# Patient Record
Sex: FEMALE | Race: WHITE
[De-identification: names, ages, dates, MRNs, and addresses within clinical notes are randomized per-mention and may not be internally consistent; named-entity substitution may affect disease eponyms.]

---

## 2017-11-16 ENCOUNTER — HOSPITAL ENCOUNTER (OUTPATIENT)
Dept: HOSPITAL 19 - MC.RAD | Age: 77
End: 2017-11-16
Attending: FAMILY MEDICINE
Payer: MEDICARE

## 2017-11-16 DIAGNOSIS — Z12.31: Primary | ICD-10-CM

## 2018-12-06 ENCOUNTER — HOSPITAL ENCOUNTER (OUTPATIENT)
Dept: HOSPITAL 19 - MC.RAD | Age: 78
End: 2018-12-06
Payer: MEDICARE

## 2018-12-06 DIAGNOSIS — Z12.31: Primary | ICD-10-CM

## 2022-06-20 ENCOUNTER — HOSPITAL ENCOUNTER (INPATIENT)
Dept: HOSPITAL 19 - COL.ER | Age: 82
LOS: 8 days | Discharge: HOME | DRG: 180 | End: 2022-06-28
Attending: FAMILY MEDICINE | Admitting: FAMILY MEDICINE
Payer: MEDICARE

## 2022-06-20 VITALS — DIASTOLIC BLOOD PRESSURE: 45 MMHG | HEART RATE: 89 BPM | SYSTOLIC BLOOD PRESSURE: 117 MMHG | TEMPERATURE: 98.1 F

## 2022-06-20 VITALS — WEIGHT: 187.61 LBS | HEIGHT: 60 IN | BODY MASS INDEX: 36.83 KG/M2

## 2022-06-20 VITALS — HEART RATE: 98 BPM | SYSTOLIC BLOOD PRESSURE: 128 MMHG | TEMPERATURE: 98.3 F | DIASTOLIC BLOOD PRESSURE: 44 MMHG

## 2022-06-20 DIAGNOSIS — Z20.822: ICD-10-CM

## 2022-06-20 DIAGNOSIS — C34.01: Primary | ICD-10-CM

## 2022-06-20 DIAGNOSIS — E78.5: ICD-10-CM

## 2022-06-20 DIAGNOSIS — Z88.7: ICD-10-CM

## 2022-06-20 DIAGNOSIS — I11.0: ICD-10-CM

## 2022-06-20 DIAGNOSIS — I50.30: ICD-10-CM

## 2022-06-20 DIAGNOSIS — Z79.01: ICD-10-CM

## 2022-06-20 DIAGNOSIS — N17.9: ICD-10-CM

## 2022-06-20 DIAGNOSIS — F41.9: ICD-10-CM

## 2022-06-20 DIAGNOSIS — Z99.81: ICD-10-CM

## 2022-06-20 DIAGNOSIS — G47.33: ICD-10-CM

## 2022-06-20 DIAGNOSIS — J96.01: ICD-10-CM

## 2022-06-20 DIAGNOSIS — R04.2: ICD-10-CM

## 2022-06-20 DIAGNOSIS — I48.0: ICD-10-CM

## 2022-06-20 DIAGNOSIS — Z98.51: ICD-10-CM

## 2022-06-20 DIAGNOSIS — Z72.89: ICD-10-CM

## 2022-06-20 DIAGNOSIS — I49.5: ICD-10-CM

## 2022-06-20 DIAGNOSIS — Z79.52: ICD-10-CM

## 2022-06-20 DIAGNOSIS — J44.1: ICD-10-CM

## 2022-06-20 DIAGNOSIS — Z87.892: ICD-10-CM

## 2022-06-20 DIAGNOSIS — C91.10: ICD-10-CM

## 2022-06-20 DIAGNOSIS — Z87.891: ICD-10-CM

## 2022-06-20 LAB
ALBUMIN SERPL-MCNC: 3.3 GM/DL (ref 3.4–4.8)
ANION GAP SERPL CALC-SCNC: 12 MMOL/L (ref 7–16)
BASOPHILS NFR BLD MANUAL: 3 % (ref 0–2)
BUN SERPL-MCNC: 29 MG/DL (ref 10–20)
CALCIUM SERPL-MCNC: 9.5 MG/DL (ref 8.4–10.2)
CHLORIDE SERPL-SCNC: 99 MMOL/L (ref 98–107)
CO2 SERPL-SCNC: 31 MMOL/L (ref 23–31)
CREAT SERPL-SCNC: 1.43 MG/DL (ref 0.57–1.11)
EOSINOPHIL NFR BLD: 1 % (ref 0–4)
ERYTHROCYTE [DISTWIDTH] IN BLOOD BY AUTOMATED COUNT: 14.4 % (ref 11.5–14.5)
GLUCOSE SERPL-MCNC: 129 MG/DL (ref 70–99)
HCT VFR BLD AUTO: 42.7 % (ref 37–47)
HGB BLD-MCNC: 14.2 G/DL (ref 12.5–16)
LYMPHOCYTES NFR BLD MANUAL: 65 % (ref 20–51)
MAGNESIUM SERPL-MCNC: 2.1 MG/DL (ref 1.6–2.6)
MCH RBC QN AUTO: 29 PG (ref 27–31)
MCHC RBC AUTO-ENTMCNC: 33 G/DL (ref 33–37)
MCV RBC AUTO: 86 FL (ref 80–100)
MONOCYTES NFR BLD: 4 % (ref 1.7–9.3)
NEUTS BAND NFR BLD: 1 % (ref 0–10)
NEUTS SEG NFR BLD MANUAL: 26 % (ref 42–75.2)
PHOSPHATE SERPL-MCNC: 3.6 MG/DL (ref 2.3–4.7)
PLATELET # BLD AUTO: 288 K/MM3 (ref 130–400)
PLATELET BLD QL SMEAR: NORMAL
PMV BLD AUTO: 9.4 FL (ref 7.4–10.4)
POTASSIUM SERPL-SCNC: 3.7 MMOL/L (ref 3.5–4.5)
RBC # BLD AUTO: 4.97 M/MM3 (ref 4.1–5.3)
SODIUM SERPL-SCNC: 142 MMOL/L (ref 136–145)
TROPONIN I SERPL-MCNC: 0.02 NG/ML (ref 0–0.03)

## 2022-06-20 PROCEDURE — A9500 TC99M SESTAMIBI: HCPCS

## 2022-06-20 NOTE — NUR
TX GIVEN VIA MOUTHPIECE, TOLERATED WELL.
PT ON 1L BLEED IN WITH HOME CPAP AFTER TX AND TOLERATING WELL.

## 2022-06-21 VITALS — SYSTOLIC BLOOD PRESSURE: 115 MMHG | HEART RATE: 71 BPM | DIASTOLIC BLOOD PRESSURE: 59 MMHG | TEMPERATURE: 97.6 F

## 2022-06-21 VITALS — TEMPERATURE: 97.4 F | SYSTOLIC BLOOD PRESSURE: 132 MMHG | HEART RATE: 88 BPM | DIASTOLIC BLOOD PRESSURE: 57 MMHG

## 2022-06-21 VITALS — DIASTOLIC BLOOD PRESSURE: 59 MMHG | TEMPERATURE: 98.4 F | SYSTOLIC BLOOD PRESSURE: 119 MMHG | HEART RATE: 100 BPM

## 2022-06-21 VITALS — SYSTOLIC BLOOD PRESSURE: 121 MMHG | HEART RATE: 117 BPM | TEMPERATURE: 98 F | DIASTOLIC BLOOD PRESSURE: 83 MMHG

## 2022-06-21 VITALS — TEMPERATURE: 97.7 F | HEART RATE: 72 BPM | DIASTOLIC BLOOD PRESSURE: 56 MMHG | SYSTOLIC BLOOD PRESSURE: 122 MMHG

## 2022-06-21 LAB
ANION GAP SERPL CALC-SCNC: 16 MMOL/L (ref 7–16)
BUN SERPL-MCNC: 26 MG/DL (ref 10–20)
CALCIUM SERPL-MCNC: 9.7 MG/DL (ref 8.4–10.2)
CHLORIDE SERPL-SCNC: 99 MMOL/L (ref 98–107)
CO2 SERPL-SCNC: 28 MMOL/L (ref 23–31)
CREAT SERPL-SCNC: 1.05 MG/DL (ref 0.57–1.11)
ERYTHROCYTE [DISTWIDTH] IN BLOOD BY AUTOMATED COUNT: 14.5 % (ref 11.5–14.5)
GLUCOSE SERPL-MCNC: 156 MG/DL (ref 70–99)
HCT VFR BLD AUTO: 46.3 % (ref 37–47)
HGB BLD-MCNC: 15 G/DL (ref 12.5–16)
LYMPHOCYTES NFR BLD MANUAL: 34 % (ref 20–51)
MAGNESIUM SERPL-MCNC: 2.1 MG/DL (ref 1.6–2.6)
MCH RBC QN AUTO: 29 PG (ref 27–31)
MCHC RBC AUTO-ENTMCNC: 32 G/DL (ref 33–37)
MCV RBC AUTO: 88 FL (ref 80–100)
MONOCYTES NFR BLD: 1 % (ref 1.7–9.3)
NEUTS BAND NFR BLD: 2 % (ref 0–10)
NEUTS SEG NFR BLD MANUAL: 63 % (ref 42–75.2)
PLATELET # BLD AUTO: 299 K/MM3 (ref 130–400)
PLATELET BLD QL SMEAR: NORMAL
PMV BLD AUTO: 9.6 FL (ref 7.4–10.4)
POTASSIUM SERPL-SCNC: 3.8 MMOL/L (ref 3.5–4.5)
RBC # BLD AUTO: 5.26 M/MM3 (ref 4.1–5.3)
SODIUM SERPL-SCNC: 143 MMOL/L (ref 136–145)

## 2022-06-21 NOTE — NUR
Mercedes:  Shyann
Situation:   stopped by room on rounds
Background:  PT was resting with family in the room.
Assessment:  PT and  had a great conversation.  PT asked for prayer
and  prayed with her, her  and daughter. Pt appreciated the
visit
Recommendation:   will follow up as needed

## 2022-06-21 NOTE — NUR
RESTED THROUGH THE NIGHT WITHOUT INCIDENT. DENIES SOA, CHEST PAIN OR DIZZY.
CPAP ON OVERNIGHT. IV STERIODS GIVEN THIS AM. CALL LIGHT WI REACH. NEEDS MET.

## 2022-06-21 NOTE — NUR
PT ADMIT AFTER FEELING SOA X 2DAYS, WENT TO SEE CARDIOLOGY TODAY SENT HERE.
ADMIT FOR HYPOXIA. HX AND ASSESSMENT OBTAINED. POC DISCUSSED. MED REC REVIEWED
W DAUGHTER. ROOM ORIENATION, CALL LIGHT WI REACH.

## 2022-06-21 NOTE — NUR
Scheduled medications given. Shift assessment preformed. VSS. Patient A&O.
Patient denies any pain, discomfort, SOA, or further needs at this time. Call
light in reach.

## 2022-06-21 NOTE — NUR
MARIANNE met with the patient, her  (Ralph, ph#[phone]), and daughter,
Babita (ph#[phone]), to discuss discharge plan. The patient lives in
[place] with her . She reports independence with ADLs and has a cane,
CPAP from [employer], walk-in shower, and grabbars. The patient's PCP is Dr. Sylvester Carr and she receives her medications from [facility]. The patient does
not have a DPOA-HC in EMR, but her  states that they do have the
document completed and that he is the patient's DPOA-HC. The patient plans on
returning home with her  upon discharge. MARIANNE asked the PA for PT/OT to
be ordered.
 
*Discharge plan: home with *

## 2022-06-22 VITALS — DIASTOLIC BLOOD PRESSURE: 76 MMHG | HEART RATE: 90 BPM | SYSTOLIC BLOOD PRESSURE: 143 MMHG | TEMPERATURE: 97.6 F

## 2022-06-22 VITALS — TEMPERATURE: 97.8 F | DIASTOLIC BLOOD PRESSURE: 66 MMHG | SYSTOLIC BLOOD PRESSURE: 109 MMHG | HEART RATE: 91 BPM

## 2022-06-22 VITALS — TEMPERATURE: 97.8 F | HEART RATE: 90 BPM | DIASTOLIC BLOOD PRESSURE: 68 MMHG | SYSTOLIC BLOOD PRESSURE: 124 MMHG

## 2022-06-22 VITALS — SYSTOLIC BLOOD PRESSURE: 120 MMHG | HEART RATE: 84 BPM | TEMPERATURE: 98.3 F | DIASTOLIC BLOOD PRESSURE: 48 MMHG

## 2022-06-22 VITALS — DIASTOLIC BLOOD PRESSURE: 59 MMHG | TEMPERATURE: 97.7 F | SYSTOLIC BLOOD PRESSURE: 136 MMHG | HEART RATE: 75 BPM

## 2022-06-22 VITALS — DIASTOLIC BLOOD PRESSURE: 60 MMHG | SYSTOLIC BLOOD PRESSURE: 135 MMHG | HEART RATE: 87 BPM | TEMPERATURE: 97.6 F

## 2022-06-22 LAB
ANION GAP SERPL CALC-SCNC: 16 MMOL/L (ref 7–16)
BUN SERPL-MCNC: 33 MG/DL (ref 10–20)
CALCIUM SERPL-MCNC: 10 MG/DL (ref 8.4–10.2)
CHLORIDE SERPL-SCNC: 98 MMOL/L (ref 98–107)
CO2 SERPL-SCNC: 28 MMOL/L (ref 23–31)
CREAT SERPL-SCNC: 0.96 MG/DL (ref 0.57–1.11)
GLUCOSE SERPL-MCNC: 140 MG/DL (ref 70–99)
INR BLD: 1.2 (ref 0.8–3)
PATH REV BLD -IMP: (no result)
POTASSIUM SERPL-SCNC: 4.4 MMOL/L (ref 3.5–4.5)
PROTHROMBIN TIME: 14.2 SECONDS (ref 9.7–12.8)
SODIUM SERPL-SCNC: 142 MMOL/L (ref 136–145)

## 2022-06-22 NOTE — NUR
PT SITTING AT BEDSIDE ON ROOM AIR. PT JUST GOT OUT OF THE SHOWER. STATES "I AM
A LITTLE SOB WHEN I GET UP AND WALK AROUND." PT STATES THAT SHE DOES NOT WANT
TO WEAR HER O2 AT THIS TIME. "I DO NOT NEED IT." PT STATES NO OTHER
NEEDS/CONCERNS AT THIS TIME. CALL LIGHT IS WITHIN REACH.

## 2022-06-22 NOTE — NUR
PT CONT TO HAVE SOME SOA, DENIES CHEST PAIN OR DIZZY. FEELS ANIXIOUS AFTER
LEARNING SHE HAS LUNG CA. XANAX GIVEN W PM MEDS. POC DISCUSSED. CALL LIGHT WI
REACH. NEEDS MET

## 2022-06-22 NOTE — NUR
PT LAYING SUPINE IN BED ON ROOM AIR WITH FAMIY AT BEDSIDE. PT STATES NO SOB AT
THIS TIME OR PAIN. PT STATES "THANK YOU FOR ALL OF YOUR CARE." PT STATES NO
NEEDS AT THIS TIME. REMINDED PT THAT SHE WILL BE NPO AT MIDNIGHT FOR STRESS
TEST TOMORROW. PT VOICED UNDERSTANDING. CALL LIGHT IS WITHIN REACH.

## 2022-06-22 NOTE — NUR
PT is recommending home. Dr. Roca, pulmonologist, notified the clinical team
that the patient has a right lung mass and is scheduled to undergo a biopsy on
Friday.
 
*Discharge plan: home with *

## 2022-06-22 NOTE — NUR
TX GIVEN VIA MOUTHPIECE, TOLERATED WELL.
PT ON ROOM AIR BEFORE AND AFTER TX.
CPAP SETUP AND READY FOR PT.

## 2022-06-23 VITALS — DIASTOLIC BLOOD PRESSURE: 77 MMHG | HEART RATE: 115 BPM | SYSTOLIC BLOOD PRESSURE: 123 MMHG

## 2022-06-23 VITALS — HEART RATE: 106 BPM | DIASTOLIC BLOOD PRESSURE: 64 MMHG | SYSTOLIC BLOOD PRESSURE: 129 MMHG

## 2022-06-23 VITALS — HEART RATE: 77 BPM | TEMPERATURE: 97.6 F | DIASTOLIC BLOOD PRESSURE: 70 MMHG | SYSTOLIC BLOOD PRESSURE: 136 MMHG

## 2022-06-23 VITALS — SYSTOLIC BLOOD PRESSURE: 108 MMHG | HEART RATE: 78 BPM | DIASTOLIC BLOOD PRESSURE: 70 MMHG

## 2022-06-23 VITALS — DIASTOLIC BLOOD PRESSURE: 61 MMHG | HEART RATE: 58 BPM | TEMPERATURE: 97.7 F | SYSTOLIC BLOOD PRESSURE: 146 MMHG

## 2022-06-23 VITALS — SYSTOLIC BLOOD PRESSURE: 151 MMHG | HEART RATE: 85 BPM | DIASTOLIC BLOOD PRESSURE: 72 MMHG

## 2022-06-23 VITALS — DIASTOLIC BLOOD PRESSURE: 65 MMHG | SYSTOLIC BLOOD PRESSURE: 104 MMHG | HEART RATE: 84 BPM

## 2022-06-23 VITALS — HEART RATE: 61 BPM | SYSTOLIC BLOOD PRESSURE: 126 MMHG | TEMPERATURE: 97.9 F | DIASTOLIC BLOOD PRESSURE: 68 MMHG

## 2022-06-23 VITALS — DIASTOLIC BLOOD PRESSURE: 77 MMHG | HEART RATE: 87 BPM | SYSTOLIC BLOOD PRESSURE: 125 MMHG

## 2022-06-23 VITALS — SYSTOLIC BLOOD PRESSURE: 129 MMHG | TEMPERATURE: 97.9 F | DIASTOLIC BLOOD PRESSURE: 57 MMHG

## 2022-06-23 VITALS — DIASTOLIC BLOOD PRESSURE: 68 MMHG | SYSTOLIC BLOOD PRESSURE: 140 MMHG | HEART RATE: 106 BPM

## 2022-06-23 VITALS — SYSTOLIC BLOOD PRESSURE: 115 MMHG | HEART RATE: 119 BPM | DIASTOLIC BLOOD PRESSURE: 63 MMHG

## 2022-06-23 VITALS — HEART RATE: 85 BPM | SYSTOLIC BLOOD PRESSURE: 95 MMHG | DIASTOLIC BLOOD PRESSURE: 74 MMHG

## 2022-06-23 VITALS — SYSTOLIC BLOOD PRESSURE: 118 MMHG | DIASTOLIC BLOOD PRESSURE: 75 MMHG | HEART RATE: 105 BPM

## 2022-06-23 VITALS — SYSTOLIC BLOOD PRESSURE: 129 MMHG | DIASTOLIC BLOOD PRESSURE: 57 MMHG | HEART RATE: 91 BPM

## 2022-06-23 VITALS — DIASTOLIC BLOOD PRESSURE: 76 MMHG | SYSTOLIC BLOOD PRESSURE: 119 MMHG | HEART RATE: 103 BPM

## 2022-06-23 VITALS — SYSTOLIC BLOOD PRESSURE: 134 MMHG | DIASTOLIC BLOOD PRESSURE: 80 MMHG | HEART RATE: 92 BPM

## 2022-06-23 VITALS — SYSTOLIC BLOOD PRESSURE: 151 MMHG | HEART RATE: 85 BPM | TEMPERATURE: 97.6 F | DIASTOLIC BLOOD PRESSURE: 72 MMHG

## 2022-06-23 VITALS — SYSTOLIC BLOOD PRESSURE: 115 MMHG | DIASTOLIC BLOOD PRESSURE: 74 MMHG | HEART RATE: 51 BPM

## 2022-06-23 VITALS — HEART RATE: 83 BPM | SYSTOLIC BLOOD PRESSURE: 126 MMHG | DIASTOLIC BLOOD PRESSURE: 72 MMHG

## 2022-06-23 VITALS — SYSTOLIC BLOOD PRESSURE: 137 MMHG | DIASTOLIC BLOOD PRESSURE: 66 MMHG | TEMPERATURE: 97.9 F | HEART RATE: 70 BPM

## 2022-06-23 VITALS — DIASTOLIC BLOOD PRESSURE: 80 MMHG | SYSTOLIC BLOOD PRESSURE: 117 MMHG | HEART RATE: 98 BPM

## 2022-06-23 VITALS — DIASTOLIC BLOOD PRESSURE: 53 MMHG | SYSTOLIC BLOOD PRESSURE: 121 MMHG | HEART RATE: 96 BPM

## 2022-06-23 VITALS — HEART RATE: 48 BPM | DIASTOLIC BLOOD PRESSURE: 66 MMHG | SYSTOLIC BLOOD PRESSURE: 109 MMHG

## 2022-06-23 VITALS — DIASTOLIC BLOOD PRESSURE: 72 MMHG | HEART RATE: 95 BPM | SYSTOLIC BLOOD PRESSURE: 104 MMHG

## 2022-06-23 LAB
ANION GAP SERPL CALC-SCNC: 12 MMOL/L (ref 7–16)
BASOPHILS # BLD: 0 K/MM3 (ref 0–0.2)
BASOPHILS NFR BLD AUTO: 0.1 % (ref 0–2)
BUN SERPL-MCNC: 33 MG/DL (ref 10–20)
CALCIUM SERPL-MCNC: 9.4 MG/DL (ref 8.4–10.2)
CHLORIDE SERPL-SCNC: 102 MMOL/L (ref 98–107)
CO2 SERPL-SCNC: 27 MMOL/L (ref 23–31)
CREAT SERPL-SCNC: 0.85 MG/DL (ref 0.57–1.11)
EOSINOPHIL # BLD: 0 K/MM3 (ref 0–0.7)
EOSINOPHIL NFR BLD: 0 % (ref 0–4)
ERYTHROCYTE [DISTWIDTH] IN BLOOD BY AUTOMATED COUNT: 14.9 % (ref 11.5–14.5)
GLUCOSE SERPL-MCNC: 139 MG/DL (ref 70–99)
GRANULOCYTES # BLD AUTO: 72.7 % (ref 42.2–75.2)
HCT VFR BLD AUTO: 43.3 % (ref 37–47)
HGB BLD-MCNC: 14 G/DL (ref 12.5–16)
LYMPHOCYTES # BLD: 5.1 K/MM3 (ref 1.2–3.4)
LYMPHOCYTES NFR BLD: 23.7 % (ref 20–51)
MAGNESIUM SERPL-MCNC: 2.5 MG/DL (ref 1.6–2.6)
MCH RBC QN AUTO: 29 PG (ref 27–31)
MCHC RBC AUTO-ENTMCNC: 32 G/DL (ref 33–37)
MCV RBC AUTO: 89 FL (ref 80–100)
MONOCYTES # BLD: 0.6 K/MM3 (ref 0.1–0.6)
MONOCYTES NFR BLD AUTO: 2.9 % (ref 1.7–9.3)
NEUTROPHILS # BLD: 15.8 K/MM3 (ref 1.4–6.5)
PLATELET # BLD AUTO: 296 K/MM3 (ref 130–400)
PMV BLD AUTO: 9.3 FL (ref 7.4–10.4)
POTASSIUM SERPL-SCNC: 4.7 MMOL/L (ref 3.5–4.5)
RBC # BLD AUTO: 4.89 M/MM3 (ref 4.1–5.3)
SODIUM SERPL-SCNC: 141 MMOL/L (ref 136–145)

## 2022-06-23 NOTE — NUR
PT LAYING SUPINE IN BED ON ROOM AIR WITH DAUGHTER AT BEDSIDE. PT STATES THAT
SHE IS HAVING A LITTLE BIT OF SOB AND IS REQUESTING A BREATHING TREATMENT. RT
WAS CALLED AND STATES THAT IT IS SHIFT CHANGE BUT THEY WILL PUT ON ON THE TOP
OF THE LIST TO COME IN. PT INFOMRED. PT STATES THAT SHE WOULD LIKE TO HAVE A
XANAX. MEDICATION WAS GIVEN. DAUGHTER STATES THAT WITH EVERYTHING GOING ON AND
THE PROCEDURE TOMORROW SHE IS JUST HAVING A LITTLE BIT OF ANXIETY. I SUGGESTED
TO PT AND FAMILY THAT MAYBE GETTING OUT OF THE ROOM AND WALKING THE HALLWAYS
TILL HELP GET A BREATH OF FRESH AIR AND HELP TAKE HER MIND OF STUFF. PT WAS
GIVEN A WHEELCHAIR AND DAUGHTER PUSHED HER IN THE HALLWAYS. PT STATES NO OTHER
CONCERNS. CALL LIGHT IS WITHIN REACH.

## 2022-06-23 NOTE — NUR
Patient assessed around 2030. Patient alert and oriented x 4, and able to make
needs known. Patient drowsy, but awakens easily. Denies pain and discomfort.
INT to left hand and left AC. Patient denies SOB and dyspnea at rest, but does
with exertion. LS CTA in left lung fields, diminished in right. HRR. Telemetry
in place. BSAx4. Abdomen soft and non-tender. Voices no questions, needs, or
concerns at this time. In bed with call light within reach. Aware that she is
NPO after midnight for bronchoscopy tomorrow morning.

## 2022-06-23 NOTE — NUR
REPORT FROM SOLOMON PATTERSON. PATIENT IN BED, EYES CLOSED, RR EVEN AND UNLABORED. RT
AT BEDSIDE. O2 SATS 94% PER RT.

## 2022-06-23 NOTE — NUR
PT LAYING SUPINE IN BED ON ROOM AIR WITH FAMILY AT BEDSIDE. PT STATES THAT SHE
IS NOT HAVING ANY SOB AT THIS TIME. "I AM JUST WAITING TO DO MY TEST."
INFOMRED PT SINCE SHE HAS BEEN INJECTED THEN IT SHOULD BE SOMETIME SOON. PT
STATES NO NEEDS OR CONCERNS AT THIS TIME. "I WILL DEF WANT A DRINK OF WATER
WHEN I GET BACK FROM THE SCAN." CALL LIGHT IS WITHIN REACH.

## 2022-06-24 VITALS — HEART RATE: 52 BPM | SYSTOLIC BLOOD PRESSURE: 149 MMHG | TEMPERATURE: 97.7 F | DIASTOLIC BLOOD PRESSURE: 60 MMHG

## 2022-06-24 VITALS — HEART RATE: 71 BPM | SYSTOLIC BLOOD PRESSURE: 143 MMHG | DIASTOLIC BLOOD PRESSURE: 87 MMHG

## 2022-06-24 VITALS — HEART RATE: 69 BPM | SYSTOLIC BLOOD PRESSURE: 119 MMHG | DIASTOLIC BLOOD PRESSURE: 98 MMHG

## 2022-06-24 VITALS — HEART RATE: 51 BPM | SYSTOLIC BLOOD PRESSURE: 161 MMHG | TEMPERATURE: 97.6 F | DIASTOLIC BLOOD PRESSURE: 74 MMHG

## 2022-06-24 VITALS — SYSTOLIC BLOOD PRESSURE: 156 MMHG | HEART RATE: 59 BPM | DIASTOLIC BLOOD PRESSURE: 70 MMHG

## 2022-06-24 VITALS — HEART RATE: 56 BPM | TEMPERATURE: 97.9 F | DIASTOLIC BLOOD PRESSURE: 90 MMHG | SYSTOLIC BLOOD PRESSURE: 189 MMHG

## 2022-06-24 VITALS — HEART RATE: 79 BPM | SYSTOLIC BLOOD PRESSURE: 171 MMHG | DIASTOLIC BLOOD PRESSURE: 92 MMHG | TEMPERATURE: 97.6 F

## 2022-06-24 VITALS — HEART RATE: 51 BPM | SYSTOLIC BLOOD PRESSURE: 163 MMHG | DIASTOLIC BLOOD PRESSURE: 71 MMHG | TEMPERATURE: 97.5 F

## 2022-06-24 VITALS — HEART RATE: 73 BPM | DIASTOLIC BLOOD PRESSURE: 115 MMHG | SYSTOLIC BLOOD PRESSURE: 173 MMHG

## 2022-06-24 VITALS — HEART RATE: 66 BPM | SYSTOLIC BLOOD PRESSURE: 115 MMHG | DIASTOLIC BLOOD PRESSURE: 76 MMHG

## 2022-06-24 VITALS — TEMPERATURE: 97.9 F | HEART RATE: 54 BPM | SYSTOLIC BLOOD PRESSURE: 169 MMHG | DIASTOLIC BLOOD PRESSURE: 83 MMHG

## 2022-06-24 LAB
ANION GAP SERPL CALC-SCNC: 13 MMOL/L (ref 7–16)
BUN SERPL-MCNC: 36 MG/DL (ref 10–20)
CALCIUM SERPL-MCNC: 9.5 MG/DL (ref 8.4–10.2)
CHLORIDE SERPL-SCNC: 104 MMOL/L (ref 98–107)
CO2 SERPL-SCNC: 27 MMOL/L (ref 23–31)
CREAT SERPL-SCNC: 1.01 MG/DL (ref 0.57–1.11)
ERYTHROCYTE [DISTWIDTH] IN BLOOD BY AUTOMATED COUNT: 14.8 % (ref 11.5–14.5)
GLUCOSE SERPL-MCNC: 132 MG/DL (ref 70–99)
HCT VFR BLD AUTO: 48.6 % (ref 37–47)
HGB BLD-MCNC: 15.4 G/DL (ref 12.5–16)
LYMPHOCYTES NFR BLD MANUAL: 43 % (ref 20–51)
MAGNESIUM SERPL-MCNC: 2.7 MG/DL (ref 1.6–2.6)
MCH RBC QN AUTO: 29 PG (ref 27–31)
MCHC RBC AUTO-ENTMCNC: 32 G/DL (ref 33–37)
MCV RBC AUTO: 90 FL (ref 80–100)
MONOCYTES NFR BLD: 1 % (ref 1.7–9.3)
NEUTS BAND NFR BLD: 3 % (ref 0–10)
NEUTS SEG NFR BLD MANUAL: 53 % (ref 42–75.2)
PLATELET # BLD AUTO: 326 K/MM3 (ref 130–400)
PLATELET BLD QL SMEAR: NORMAL
PMV BLD AUTO: 9.5 FL (ref 7.4–10.4)
POTASSIUM SERPL-SCNC: 4.7 MMOL/L (ref 3.5–4.5)
RBC # BLD AUTO: 5.4 M/MM3 (ref 4.1–5.3)
SODIUM SERPL-SCNC: 144 MMOL/L (ref 136–145)

## 2022-06-24 PROCEDURE — 0B938ZZ DRAINAGE OF RIGHT MAIN BRONCHUS, VIA NATURAL OR ARTIFICIAL OPENING ENDOSCOPIC: ICD-10-PCS | Performed by: INTERNAL MEDICINE

## 2022-06-24 PROCEDURE — 0B9J8ZZ DRAINAGE OF LEFT LOWER LUNG LOBE, VIA NATURAL OR ARTIFICIAL OPENING ENDOSCOPIC: ICD-10-PCS | Performed by: INTERNAL MEDICINE

## 2022-06-24 PROCEDURE — 0BDC8ZX EXTRACTION OF RIGHT UPPER LUNG LOBE, VIA NATURAL OR ARTIFICIAL OPENING ENDOSCOPIC, DIAGNOSTIC: ICD-10-PCS | Performed by: INTERNAL MEDICINE

## 2022-06-24 PROCEDURE — 0B9C8ZX DRAINAGE OF RIGHT UPPER LUNG LOBE, VIA NATURAL OR ARTIFICIAL OPENING ENDOSCOPIC, DIAGNOSTIC: ICD-10-PCS | Performed by: INTERNAL MEDICINE

## 2022-06-24 PROCEDURE — 0BDB8ZX EXTRACTION OF LEFT LOWER LOBE BRONCHUS, VIA NATURAL OR ARTIFICIAL OPENING ENDOSCOPIC, DIAGNOSTIC: ICD-10-PCS | Performed by: INTERNAL MEDICINE

## 2022-06-24 NOTE — NUR
PATIENT VERY PLESANT, SLIGHTLY ANXIOUS ABOUT BRONCHOSCOPY THIS MORNING.
PATIENT DAUGHTER AT BEDSIDE. NO COMPLAINTS. SO NOTED DIFFICULTY WITH
BREATHING, O2 SATURATIONS UP 95%, PATIENT SEEMS SOB HOWEVER. WILL CONTINUE TO
MONITOR.

## 2022-06-24 NOTE — NUR
Patient assessed around 2030. Denied pain and discomfort. Peripheral INT to
left hand and left AC. Continues on oxygen at 3 L/min via NC. LS coarse
crackles right upper lobe, diminished right middle and lower. CTA left lobes.
Wears CPAP at night. Complained of increased SOB around 2200. SPO2 94%. Given
Xanax and called RT for neb tx. Also given PRN Appresoline for elevated BP per
order. HRR. Telemetry in place-sinus pawan. BSAx4. Voices no further
questions, needs, or concerns at this time. In bed with call light within
reach.

## 2022-06-24 NOTE — NUR
PATIENT ARRIVED BACK FROM ENDO LAB ALERT AND ORIENTED, NO COMPLAINTS OF PAIN.
POST-OP VITALS INITIATED, DIET ORDERED AND PATIENT SETTLED IN ROOM.

## 2022-06-24 NOTE — NUR
Patient has denied having pain and discomfort, except for being cold. Given
warm blanket and turned temperature up in room which patient reports helped.
Has been NPO since midnight. Voices no questions, needs, or concerns at this
time. In bed with call light within reach.

## 2022-06-24 NOTE — NUR
Patient tolerated broncoscopy, biopsy and wash well. Worked with physical and
occupational therapy after, ate a full meal. No hemoptsis. No complaints of
pain. Patient rested this afternoon, stated feeling "physically drained" after
procedures and workout. No significant events today.

## 2022-06-25 VITALS — HEART RATE: 62 BPM | TEMPERATURE: 97.7 F | DIASTOLIC BLOOD PRESSURE: 70 MMHG | SYSTOLIC BLOOD PRESSURE: 143 MMHG

## 2022-06-25 VITALS — HEART RATE: 59 BPM | TEMPERATURE: 99.2 F | DIASTOLIC BLOOD PRESSURE: 71 MMHG | SYSTOLIC BLOOD PRESSURE: 155 MMHG

## 2022-06-25 VITALS — DIASTOLIC BLOOD PRESSURE: 71 MMHG | SYSTOLIC BLOOD PRESSURE: 175 MMHG | TEMPERATURE: 97.8 F | HEART RATE: 55 BPM

## 2022-06-25 VITALS — TEMPERATURE: 97.9 F | DIASTOLIC BLOOD PRESSURE: 75 MMHG | HEART RATE: 54 BPM | SYSTOLIC BLOOD PRESSURE: 166 MMHG

## 2022-06-25 VITALS — HEART RATE: 58 BPM | SYSTOLIC BLOOD PRESSURE: 179 MMHG | TEMPERATURE: 97.7 F | DIASTOLIC BLOOD PRESSURE: 65 MMHG

## 2022-06-25 VITALS — DIASTOLIC BLOOD PRESSURE: 70 MMHG | TEMPERATURE: 97.7 F | SYSTOLIC BLOOD PRESSURE: 146 MMHG | HEART RATE: 55 BPM

## 2022-06-25 VITALS — HEART RATE: 58 BPM | TEMPERATURE: 97.7 F | DIASTOLIC BLOOD PRESSURE: 76 MMHG | SYSTOLIC BLOOD PRESSURE: 174 MMHG

## 2022-06-25 LAB
ANION GAP SERPL CALC-SCNC: 12 MMOL/L (ref 7–16)
BUN SERPL-MCNC: 38 MG/DL (ref 10–20)
CALCIUM SERPL-MCNC: 9.1 MG/DL (ref 8.4–10.2)
CHLORIDE SERPL-SCNC: 107 MMOL/L (ref 98–107)
CO2 SERPL-SCNC: 25 MMOL/L (ref 23–31)
CREAT SERPL-SCNC: 0.81 MG/DL (ref 0.57–1.11)
ERYTHROCYTE [DISTWIDTH] IN BLOOD BY AUTOMATED COUNT: 14.4 % (ref 11.5–14.5)
GLUCOSE SERPL-MCNC: 115 MG/DL (ref 70–99)
HCT VFR BLD AUTO: 44.9 % (ref 37–47)
HGB BLD-MCNC: 14.7 G/DL (ref 12.5–16)
LYMPHOCYTES NFR BLD MANUAL: 37 % (ref 20–51)
MAGNESIUM SERPL-MCNC: 2.6 MG/DL (ref 1.6–2.6)
MCH RBC QN AUTO: 29 PG (ref 27–31)
MCHC RBC AUTO-ENTMCNC: 33 G/DL (ref 33–37)
MCV RBC AUTO: 87 FL (ref 80–100)
MONOCYTES NFR BLD: 2 % (ref 1.7–9.3)
NEUTS SEG NFR BLD MANUAL: 61 % (ref 42–75.2)
PLATELET # BLD AUTO: 296 K/MM3 (ref 130–400)
PLATELET BLD QL SMEAR: NORMAL
PMV BLD AUTO: 9.6 FL (ref 7.4–10.4)
POTASSIUM SERPL-SCNC: 4.6 MMOL/L (ref 3.5–4.5)
RBC # BLD AUTO: 5.16 M/MM3 (ref 4.1–5.3)
SODIUM SERPL-SCNC: 144 MMOL/L (ref 136–145)

## 2022-06-25 NOTE — NUR
PATIENT HAD NO SIGNIFICANT EVENTS TODAY. MAY NEED HOME O2. PATIENT STRUGGLED
WITH SHORTNESS OF BREATH AFTER SHOWER, OXYGEN SATURATION AT 90%, WITH SOME
DIFFICULTY BREATHING. ONCE O2 PLACED, PATIENT QUICKLY RECOVERED. PATIENT
REQUESTING XANAX AT NIGHT TO HELP WITH ANXIOUSNESS AND FOR SLEEP. MAY NEED
PRESCRIPTION WHEN DISCHARGED. PATIENT STILL VERY INDEPENDENT IN ROOM. ONE DOSE
OF IV HYDROLAZINE NEEDED TODAY FOR SYSTOLIC BP>170. PATIENT RESTING MOST OF
THE DAY TODAY. NO COMPLAINTS OF PAIN. PLAN FOR DISCHARGE TOMORROW AFTER 6TH
DOSE OF SOTOLOL.

## 2022-06-25 NOTE — NUR
Patient assessed around 2010. On oxygen at 1 L/min via NC. Wears CPAP at
night. Reports feeling much better today. Denies pain and discomfort. In bed
with call light within reach. Voices no questions, needs, or concerns at this
time.

## 2022-06-25 NOTE — NUR
Patient wore CPAP throughout the night. Denies pain and discomfort. Voices no
questions, needs, or concerns at this time. In bed with call light within
reach.

## 2022-06-25 NOTE — NUR
PATIENT UP AND MOVING IN ROOM, SHOWER INDEPENDENTLY WITH DAUGHTER. NO
COMPLAINTS OF PAIN, NO CHANGES IN CONDITION OVER NIGHT. PLAN, AFTER SOTOLOL
INITIATION AND CLEARANCE FROM CARDIO TO DISCHARGE HOME. PATIENT HAVING SOME
LOOSE STOOLS, NO SIGNS OF INFECTION HOWEVER. NO UNCONTROLLED INCONTINENCE.

## 2022-06-26 VITALS — SYSTOLIC BLOOD PRESSURE: 131 MMHG | HEART RATE: 61 BPM | DIASTOLIC BLOOD PRESSURE: 61 MMHG | TEMPERATURE: 97.4 F

## 2022-06-26 VITALS — HEART RATE: 75 BPM | TEMPERATURE: 97.6 F | SYSTOLIC BLOOD PRESSURE: 178 MMHG | DIASTOLIC BLOOD PRESSURE: 81 MMHG

## 2022-06-26 VITALS — HEART RATE: 61 BPM | DIASTOLIC BLOOD PRESSURE: 55 MMHG | TEMPERATURE: 97.9 F | SYSTOLIC BLOOD PRESSURE: 130 MMHG

## 2022-06-26 VITALS — HEART RATE: 68 BPM | DIASTOLIC BLOOD PRESSURE: 91 MMHG | SYSTOLIC BLOOD PRESSURE: 189 MMHG | TEMPERATURE: 98.4 F

## 2022-06-26 VITALS — DIASTOLIC BLOOD PRESSURE: 58 MMHG | SYSTOLIC BLOOD PRESSURE: 153 MMHG | TEMPERATURE: 98 F | HEART RATE: 57 BPM

## 2022-06-26 LAB
ANION GAP SERPL CALC-SCNC: 10 MMOL/L (ref 7–16)
BUN SERPL-MCNC: 39 MG/DL (ref 10–20)
CALCIUM SERPL-MCNC: 9.4 MG/DL (ref 8.4–10.2)
CHLORIDE SERPL-SCNC: 107 MMOL/L (ref 98–107)
CO2 SERPL-SCNC: 26 MMOL/L (ref 23–31)
CREAT SERPL-SCNC: 0.81 MG/DL (ref 0.57–1.11)
ERYTHROCYTE [DISTWIDTH] IN BLOOD BY AUTOMATED COUNT: 14.7 % (ref 11.5–14.5)
GLUCOSE SERPL-MCNC: 131 MG/DL (ref 70–99)
HCT VFR BLD AUTO: 48.9 % (ref 37–47)
HGB BLD-MCNC: 16 G/DL (ref 12.5–16)
HYPOCHROMIA BLD QL SMEAR: (no result)
LYMPHOCYTES NFR BLD MANUAL: 41 % (ref 20–51)
MAGNESIUM SERPL-MCNC: 2.7 MG/DL (ref 1.6–2.6)
MCH RBC QN AUTO: 29 PG (ref 27–31)
MCHC RBC AUTO-ENTMCNC: 33 G/DL (ref 33–37)
MCV RBC AUTO: 88 FL (ref 80–100)
MONOCYTES NFR BLD: 1 % (ref 1.7–9.3)
NEUTS SEG NFR BLD MANUAL: 58 % (ref 42–75.2)
PLATELET # BLD AUTO: 318 K/MM3 (ref 130–400)
PLATELET BLD QL SMEAR: NORMAL
PMV BLD AUTO: 9.7 FL (ref 7.4–10.4)
POTASSIUM SERPL-SCNC: 4.9 MMOL/L (ref 3.5–4.5)
RBC # BLD AUTO: 5.55 M/MM3 (ref 4.1–5.3)
SODIUM SERPL-SCNC: 143 MMOL/L (ref 136–145)

## 2022-06-26 NOTE — NUR
Patient wore CPAP during the night. Received PRN Appresoline for elevated BP,
see MAR. Denies pain and discomfort. Voices no questions, needs, or concerns
at this time. In bed with call light within reach.

## 2022-06-26 NOTE — NUR
SW advised pt on important message from medicare IM sheet. Pt read and signed
it. MARIANNE placed copy in chart and gave pt a copy.

## 2022-06-26 NOTE — NUR
PATIENT RESTED IN BED FOR MOST OF THE DAY. FAMILY AND FRIENDS TOOK TURNS
VISITING. NO COMPLAINTS OF PAIN. TOLERATED ROOM AIR WITH O2 SATS IN MID TO LOW
90S. NO SIGNIFICANT EVENTS. NSR ON TELE. POSSIBLE HEART CATH TOMORROW?

## 2022-06-27 VITALS — SYSTOLIC BLOOD PRESSURE: 114 MMHG | TEMPERATURE: 97.5 F | DIASTOLIC BLOOD PRESSURE: 68 MMHG | HEART RATE: 60 BPM

## 2022-06-27 VITALS — DIASTOLIC BLOOD PRESSURE: 53 MMHG | SYSTOLIC BLOOD PRESSURE: 89 MMHG | TEMPERATURE: 98.5 F | HEART RATE: 60 BPM

## 2022-06-27 VITALS — DIASTOLIC BLOOD PRESSURE: 83 MMHG | TEMPERATURE: 97.9 F | SYSTOLIC BLOOD PRESSURE: 191 MMHG | HEART RATE: 56 BPM

## 2022-06-27 VITALS — HEART RATE: 50 BPM | SYSTOLIC BLOOD PRESSURE: 180 MMHG | TEMPERATURE: 97.5 F | DIASTOLIC BLOOD PRESSURE: 72 MMHG

## 2022-06-27 VITALS — DIASTOLIC BLOOD PRESSURE: 65 MMHG | TEMPERATURE: 97.7 F | HEART RATE: 52 BPM | SYSTOLIC BLOOD PRESSURE: 187 MMHG

## 2022-06-27 VITALS — DIASTOLIC BLOOD PRESSURE: 82 MMHG | SYSTOLIC BLOOD PRESSURE: 161 MMHG

## 2022-06-27 VITALS — TEMPERATURE: 97.5 F | DIASTOLIC BLOOD PRESSURE: 80 MMHG | HEART RATE: 80 BPM | SYSTOLIC BLOOD PRESSURE: 139 MMHG

## 2022-06-27 LAB
ALBUMIN SERPL-MCNC: 3.1 GM/DL (ref 3.4–4.8)
ALP SERPL-CCNC: 43 U/L (ref 40–150)
ALT SERPL-CCNC: 43 U/L (ref 0–55)
ANION GAP SERPL CALC-SCNC: 10 MMOL/L (ref 7–16)
AST SERPL-CCNC: 15 U/L (ref 5–34)
BILIRUB DIRECT SERPL-MCNC: 0.4 MG/DL (ref 0–0.5)
BILIRUB SERPL-MCNC: 1.4 MG/DL (ref 0.2–1.2)
BUN SERPL-MCNC: 39 MG/DL (ref 10–20)
CALCIUM SERPL-MCNC: 9.1 MG/DL (ref 8.4–10.2)
CHLORIDE SERPL-SCNC: 107 MMOL/L (ref 98–107)
CO2 SERPL-SCNC: 25 MMOL/L (ref 23–31)
CREAT SERPL-SCNC: 0.8 MG/DL (ref 0.57–1.11)
ERYTHROCYTE [DISTWIDTH] IN BLOOD BY AUTOMATED COUNT: 14.7 % (ref 11.5–14.5)
GLUCOSE SERPL-MCNC: 108 MG/DL (ref 70–99)
HCT VFR BLD AUTO: 46.2 % (ref 37–47)
HGB BLD-MCNC: 15.2 G/DL (ref 12.5–16)
HYPOCHROMIA BLD QL SMEAR: (no result)
LYMPHOCYTES NFR BLD MANUAL: 34 % (ref 20–51)
MCH RBC QN AUTO: 29 PG (ref 27–31)
MCHC RBC AUTO-ENTMCNC: 33 G/DL (ref 33–37)
MCV RBC AUTO: 87 FL (ref 80–100)
METAMYELOCYTES NFR BLD MANUAL: 1 % (ref 0–0)
MONOCYTES NFR BLD: 3 % (ref 1.7–9.3)
NEUTS BAND NFR BLD: 1 % (ref 0–10)
NEUTS SEG NFR BLD MANUAL: 63 % (ref 42–75.2)
PLATELET # BLD AUTO: 260 K/MM3 (ref 130–400)
PLATELET BLD QL SMEAR: NORMAL
PMV BLD AUTO: 9.6 FL (ref 7.4–10.4)
POTASSIUM SERPL-SCNC: 4.7 MMOL/L (ref 3.5–4.5)
PROT SERPL-MCNC: 5.9 GM/DL (ref 6.2–8.1)
RBC # BLD AUTO: 5.3 M/MM3 (ref 4.1–5.3)
SODIUM SERPL-SCNC: 142 MMOL/L (ref 136–145)

## 2022-06-27 NOTE — NUR
PT LAYING SUPINE IN BED ON ROOM AIR WITH FAMILY AT BEDSIDE. PT STATES THAT SHE
IS NOT HAVING ANY SOB OR PAIN AT THIS TIME. PT WAS NPO SINCE MIDNIGHT FOR
POSSIBLE HEART CATH AND CARDIO HAS CONFIRMED NO HEART CATH AT THIS TIME. PT
WAS GIVEN BREAKFAST AND WAS ABLE TO EAT. PT AND FAMILY STATES NO OTHER
CONCERNS/NEEDS. CALL LIGHT IS WITHIN REACH.

## 2022-06-27 NOTE — NUR
PT LAYING SUPINE IN BED ON 4 LITS VIA NC. FAMILY AT BEDSIDE. PT STATES THAT "I
AM READY TO GO HOME. I WANT TO BE AT HOME WHERE I AM MORE COMFORTABLE." PT
STATES THAT SHE IS NOT HAVING ANY SOB AT THIS TIME. PT AND FAMILY STATES NO
NEEDS OR CONCERNS AT THIS TIME. CALL LIGHT IS WITHIN REACH.

## 2022-06-27 NOTE — NUR
POTASSIUM NOTED TO BE ELVATED AT 4.9 06/26 AM,REPORTED TO MYESHA AS PT HAS A
DOSE DUE THIS AM. HOLD UNTIL AFTER LABS TO SEE LEVEL, ORDER PLACED.

## 2022-06-28 VITALS — SYSTOLIC BLOOD PRESSURE: 100 MMHG | HEART RATE: 59 BPM | TEMPERATURE: 97.8 F | DIASTOLIC BLOOD PRESSURE: 59 MMHG

## 2022-06-28 VITALS — DIASTOLIC BLOOD PRESSURE: 58 MMHG | SYSTOLIC BLOOD PRESSURE: 126 MMHG | TEMPERATURE: 97.5 F | HEART RATE: 55 BPM

## 2022-06-28 VITALS — DIASTOLIC BLOOD PRESSURE: 52 MMHG | TEMPERATURE: 98.2 F | SYSTOLIC BLOOD PRESSURE: 109 MMHG | HEART RATE: 59 BPM

## 2022-06-28 VITALS — TEMPERATURE: 97.8 F | DIASTOLIC BLOOD PRESSURE: 72 MMHG | SYSTOLIC BLOOD PRESSURE: 126 MMHG | HEART RATE: 68 BPM

## 2022-06-28 VITALS — SYSTOLIC BLOOD PRESSURE: 90 MMHG | DIASTOLIC BLOOD PRESSURE: 55 MMHG | HEART RATE: 55 BPM | TEMPERATURE: 98.3 F

## 2022-06-28 LAB
ANION GAP SERPL CALC-SCNC: 10 MMOL/L (ref 7–16)
BUN SERPL-MCNC: 38 MG/DL (ref 10–20)
CALCIUM SERPL-MCNC: 8.6 MG/DL (ref 8.4–10.2)
CHLORIDE SERPL-SCNC: 107 MMOL/L (ref 98–107)
CO2 SERPL-SCNC: 26 MMOL/L (ref 23–31)
CREAT SERPL-SCNC: 0.83 MG/DL (ref 0.57–1.11)
ERYTHROCYTE [DISTWIDTH] IN BLOOD BY AUTOMATED COUNT: 14.9 % (ref 11.5–14.5)
GLUCOSE SERPL-MCNC: 107 MG/DL (ref 70–99)
HCT VFR BLD AUTO: 43.8 % (ref 37–47)
HGB BLD-MCNC: 14.4 G/DL (ref 12.5–16)
LYMPHOCYTES NFR BLD MANUAL: 34 % (ref 20–51)
MCH RBC QN AUTO: 29 PG (ref 27–31)
MCHC RBC AUTO-ENTMCNC: 33 G/DL (ref 33–37)
MCV RBC AUTO: 87 FL (ref 80–100)
MONOCYTES NFR BLD: 1 % (ref 1.7–9.3)
NEUTS BAND NFR BLD: 2 % (ref 0–10)
NEUTS SEG NFR BLD MANUAL: 63 % (ref 42–75.2)
PLATELET # BLD AUTO: 199 K/MM3 (ref 130–400)
PLATELET BLD QL SMEAR: NORMAL
PMV BLD AUTO: 9.7 FL (ref 7.4–10.4)
POTASSIUM SERPL-SCNC: 4.1 MMOL/L (ref 3.5–4.5)
RBC # BLD AUTO: 5.03 M/MM3 (ref 4.1–5.3)
SODIUM SERPL-SCNC: 143 MMOL/L (ref 136–145)

## 2022-06-28 NOTE — NUR
Pt laying supine in bed on 2 lit via NC. Daughter at bedse. Pt states no
pain or SOB at this time. "I am ready to go home." Oxygen has been ordered and
the daughter is awaiting a call for when it is ready. Pt states no needs or
concerns at this time.  Daughter states she woudl like some ice water. Items
were given to daughter. Call light  is within reach.

## 2022-06-28 NOTE — NUR
The clinical team is ready to discharge the patient today. An exercise
oximetry was ordered. RT notified SW that the patient qualified for 2 liters
of oxygen with exertion. SW met with the patient and her daugher, Manda,
to update and follow up on DME company preference. The patient and Manda
would like to use [employer]. Manda reports that she can  the oxygen
from [employer] today.
 
SW contacted and faxed and emailed the oxygen order to Lyndsey at [facility]. SW
requested that she contact the patient's daughter, Manda, once the order
is ready to today. SW updated the patient's RN.
 
The patient is to discharge back home with her  today, 6/28. No
additional needs at this time.

## 2022-06-28 NOTE — NUR
PT HAD UNEVENTFUL NIGHT. DENIES PAIN, SOME DISCOMFORT WITH BREATHING, BUT HAS
NOT HAD ANY SEVERE SHORTNESS OF BREATH LIKE SHE DID DURING THE DAY SHIFT 6/27.
PT STATES SHE IS READY TO GO HOME. NO OTHER CONCERNS. REPORT GIVEN TO LEONARDO MAGANA.

## 2022-07-20 ENCOUNTER — HOSPITAL ENCOUNTER (OUTPATIENT)
Dept: HOSPITAL 19 - MC.RAD | Age: 82
End: 2022-07-20
Payer: MEDICARE

## 2022-07-20 DIAGNOSIS — N63.20: Primary | ICD-10-CM

## 2022-07-20 DIAGNOSIS — C91.10: ICD-10-CM

## 2022-07-20 DIAGNOSIS — C34.31: ICD-10-CM
